# Patient Record
Sex: FEMALE | Race: ASIAN
[De-identification: names, ages, dates, MRNs, and addresses within clinical notes are randomized per-mention and may not be internally consistent; named-entity substitution may affect disease eponyms.]

---

## 2017-07-17 ENCOUNTER — HOSPITAL ENCOUNTER (EMERGENCY)
Dept: HOSPITAL 74 - JER | Age: 22
Discharge: HOME | End: 2017-07-17
Payer: COMMERCIAL

## 2017-07-17 VITALS — HEART RATE: 75 BPM | SYSTOLIC BLOOD PRESSURE: 134 MMHG | DIASTOLIC BLOOD PRESSURE: 64 MMHG

## 2017-07-17 VITALS — BODY MASS INDEX: 18.1 KG/M2

## 2017-07-17 VITALS — TEMPERATURE: 98 F

## 2017-07-17 DIAGNOSIS — O21.1: Primary | ICD-10-CM

## 2017-07-17 DIAGNOSIS — Z3A.11: ICD-10-CM

## 2017-07-17 LAB
ALBUMIN SERPL-MCNC: 3.7 G/DL (ref 3.4–5)
ALP SERPL-CCNC: 60 U/L (ref 45–117)
ALT SERPL-CCNC: 17 U/L (ref 12–78)
ANION GAP SERPL CALC-SCNC: 8 MMOL/L (ref 8–16)
AST SERPL-CCNC: 19 U/L (ref 15–37)
BASOPHILS # BLD: 0.3 % (ref 0–2)
BILIRUB SERPL-MCNC: < 0.1 MG/DL (ref 0.2–1)
CALCIUM SERPL-MCNC: 8.9 MG/DL (ref 8.5–10.1)
CO2 SERPL-SCNC: 24 MMOL/L (ref 21–32)
CREAT SERPL-MCNC: 0.4 MG/DL (ref 0.55–1.02)
DEPRECATED RDW RBC AUTO: 12.9 % (ref 11.6–15.6)
EOSINOPHIL # BLD: 1.9 % (ref 0–4.5)
GLUCOSE SERPL-MCNC: 103 MG/DL (ref 74–106)
MCH RBC QN AUTO: 28.1 PG (ref 25.7–33.7)
MCHC RBC AUTO-ENTMCNC: 33.4 G/DL (ref 32–36)
MCV RBC: 84.1 FL (ref 80–96)
NEUTROPHILS # BLD: 72.4 % (ref 42.8–82.8)
PLATELET # BLD AUTO: 222 K/MM3 (ref 134–434)
PMV BLD: 11.4 FL (ref 7.5–11.1)
PROT SERPL-MCNC: 7.2 G/DL (ref 6.4–8.2)
WBC # BLD AUTO: 11.2 K/MM3 (ref 4–10)

## 2017-07-17 PROCEDURE — 3E033GC INTRODUCTION OF OTHER THERAPEUTIC SUBSTANCE INTO PERIPHERAL VEIN, PERCUTANEOUS APPROACH: ICD-10-PCS

## 2017-07-17 NOTE — PDOC
History of Present Illness





<Anali Callejas - Last Filed: 17 20:23>





- History of Present Illness


Initial Comments: 


17 20:55


Patient is a 22 year old pregnant female (11 weeks) with no significant medical 

hx who has been sent to the ED from gynecologists office for hyperemesis. 

Today the patient saw her OB/GYN for hyperemesis that shes been having 

throughout her pregnancy, and she was sent to the ED with a prescription for IV 

fluids. Denies any fevers, chills, vaginal bleeding, cramping, or vaginal 

discharge.





OB/GYN: Diana James MD


Surgical Hx: Right breast biopsy (benign)


;  LNMP: 2017 


Allergies: NKDA








<Myra Reyna - Last Filed: 17 20:57>





- General


Chief Complaint: Pain, Acute


Stated Complaint: DEHYDRATION/11 WKS PREGNANT


Time Seen by Provider: 17 18:47





Past History





- Past Medical History


Other medical history: PATIENT DENIES MEDICAL HX





- Reproductive History


Is Patient Pregnant Now?: Yes





- Psycho/Social/Smoking Cessation Hx


Suicidal Ideation: No


Smoking History: Never smoked


Hx Alcohol Use: No


Drug/Substance Use Hx: No





<Anali Callejas - Last Filed: 17 20:23>





<Myra Reyna - Last Filed: 17 20:57>





- Past Medical History


Allergies/Adverse Reactions: 


 Allergies











Allergy/AdvReac Type Severity Reaction Status Date / Time


 


No Known Allergies Allergy   Verified 17 16:23











Home Medications: 


Ambulatory Orders





Prenatal Cmb#95/Iron/FA/Dha [Prenatal + Dha Combo Pack] 1 each PO DAILY  











**Review of Systems





- Review of Systems


Comments:: 


17 20:55


CONSTITUTIONAL:


Absent: fever, chills, diaphoresis, generalized weakness, malaise, loss of 

appetite


HEENT:


Absent: rhinorrhea, nasal congestion, throat pain, throat swelling, difficulty 

swallowing, mouth swelling, ear pain, eye pain, visual changes


CARDIOVASCULAR: 


Absent: chest pain, syncope, palpitations, irregular heart rate, lightheadedness

, peripheral edema


RESPIRATORY: 


Absent: cough, shortness of breath, dyspnea with exertion, orthopnea, wheezing, 

stridor, hemoptysis


GASTROINTESTINAL:


Present: nausea, vomiting


Absent: abdominal pain, abdominal distension, diarrhea, constipation, melena, 

hematochezia


GENITOURINARY: 


Absent: dysuria, frequency, urgency, hesitancy, hematuria, flank pain, genital 

pain


MUSCULOSKELETAL: 


Absent: myalgia, arthralgia, joint swelling


SKIN: 


Absent: rash, itching, pallor


HEMATOLOGIC/IMMUNOLOGIC: 


Absent: easy bleeding, easy bruising, lymphadenopathy, frequent infections


ENDOCRINE:


Absent: unexplained weight gain, unexplained weight loss, heat intolerance, 

cold intolerance


NEUROLOGIC: 


Absent: headache, focal weakness or paresthesia, dizziness, unsteady gait, 

seizure, mental status changes, bladder or bowel incontinence.


PSYCHIATRIC: 


Absent: anxiety, depression, suicidal or homicidal ideation, hallucinations








<Myra Reyna - Last Filed: 17 20:57>





*Physical Exam





- Vital Signs


 Last Vital Signs











Temp Pulse Resp BP Pulse Ox


 


 98.0 F   94 H  16   102/65   100 


 


 17 16:19  17 16:19  17 16:19  17 16:19  17 16:19














<Anali Callejas - Last Filed: 17 20:23>





- Vital Signs


 Last Vital Signs











Temp Pulse Resp BP Pulse Ox


 


 98.0 F   75   18   134/64   98 


 


 17 16:19  17 20:26  17 20:26  17 20:26  17 20:26














- Physical Exam


Comments: 


17 20:56


GENERAL:


Well developed, well nourished. Awake and alert. No acute distress.


HEENT:


Normocephalic, atraumatic. PERRLA, EOMI. No conjunctival pallor. Sclera are non-

icteric. Moist mucous membranes. Oropharynx is clear.


NECK: 


Supple. Full ROM. No JVD. Carotid pulses 2+ and symmetric, without bruits. No 

thyromegaly. No lymphadenopathy.


CARDIOVASCULAR:


Regular rate and rhythm. No murmurs, rubs, or gallops. Distal pulses are 2+ and 

symmetric. 


PULMONARY: 


No evidence of respiratory distress. Lungs clear to auscultation bilaterally. 

No wheezing, rales or rhonchi.


ABDOMINAL:


Soft. Non-tender. Non-distended. No rebound or guarding. No organomegaly. 

Normoactive bowel sounds. 


MUSCULOSKELETAL: 


Normal range of motion at all joints. No bony deformities or tenderness. No CVA 

tenderness.


EXTREMITIES: 


No cyanosis. No clubbing. No edema. No calf tenderness.


SKIN: 


Warm and dry. Normal capillary refill. No rashes. No jaundice. 


NEUROLOGICAL: 


Alert, awake, appropriate. Cranial nerves 2-12 intact. Normal speech. Gait is 

normal without ataxia.


PSYCHIATRIC: 


Cooperative. Good eye contact. Appropriate mood and affect.








<Myra Reyna - Last Filed: 17 20:57>





ED Treatment Course





- LABORATORY


CBC & Chemistry Diagram: 


 17 19:00





 17 19:00





- ADDITIONAL ORDERS


Additional order review: 


 Laboratory  Results











  17





  19:00


 


Sodium  140


 


Potassium  3.9


 


Chloride  108 H


 


Carbon Dioxide  24


 


Anion Gap  8


 


BUN  12


 


Creatinine  0.4 L


 


Creat Clearance w eGFR  > 60


 


Random Glucose  103


 


Calcium  8.9


 


Total Bilirubin  < 0.1 L


 


AST  19


 


ALT  17


 


Alkaline Phosphatase  60


 


Total Protein  7.2


 


Albumin  3.7








 











  17





  19:00


 


RBC  4.07


 


MCV  84.1


 


MCHC  33.4


 


RDW  12.9


 


MPV  11.4 H


 


Neutrophils %  72.4


 


Lymphocytes %  19.6


 


Monocytes %  5.8


 


Eosinophils %  1.9


 


Basophils %  0.3














- Medications


Given in the ED: 


ED Medications














Discontinued Medications














Generic Name Dose Route Start Last Admin





  Trade Name Freq  PRN Reason Stop Dose Admin


 


Sodium Chloride  1,000 mls @ 1,000 mls/hr 17 18:48 17 19:19





  Normal Saline -  IV 17 19:47  1,000 mls/hr





  ASDIR STA   Administration


 


Ondansetron HCl  4 mg 17 18:49 17 19:19





  Zofran Injection  IVPUSH 17 18:50  4 mg





  ONCE ONE   Administration














<Anali Callejas - Last Filed: 17 20:23>





- LABORATORY


CBC & Chemistry Diagram: 


 17 19:00





 17 19:00





- ADDITIONAL ORDERS


Additional order review: 


 Laboratory  Results











  17





  19:00


 


Sodium  140


 


Potassium  3.9


 


Chloride  108 H


 


Carbon Dioxide  24


 


Anion Gap  8


 


BUN  12


 


Creatinine  0.4 L


 


Creat Clearance w eGFR  > 60


 


Random Glucose  103


 


Calcium  8.9


 


Total Bilirubin  < 0.1 L


 


AST  19


 


ALT  17


 


Alkaline Phosphatase  60


 


Total Protein  7.2


 


Albumin  3.7








 











  17





  19:00


 


RBC  4.07


 


MCV  84.1


 


MCHC  33.4


 


RDW  12.9


 


MPV  11.4 H


 


Neutrophils %  72.4


 


Lymphocytes %  19.6


 


Monocytes %  5.8


 


Eosinophils %  1.9


 


Basophils %  0.3














- Medications


Given in the ED: 


ED Medications














Discontinued Medications














Generic Name Dose Route Start Last Admin





  Trade Name Liz  PRN Reason Stop Dose Admin


 


Sodium Chloride  1,000 mls @ 1,000 mls/hr 17 18:48 17 19:19





  Normal Saline -  IV 17 19:47  1,000 mls/hr





  ASDIR STA   Administration


 


Ondansetron HCl  4 mg 17 18:49 17 19:19





  Zofran Injection  IVPUSH 17 18:50  4 mg





  ONCE ONE   Administration














<Myra Reyna - Last Filed: 17 20:57>





*DC/Admit/Observation/Transfer





<Anali Callejas - Last Filed: 17 20:23>





- Attestations


Scribe Attestion: 


17 20:57


Documentation prepared by Myra Reyna, acting as medical scribe for Anali Callejas MD.





<Myra Reyna - Last Filed: 17 20:57>


Diagnosis at time of Disposition: 


 Hyperemesis arising during pregnancy





- Discharge Dispostion


Disposition: HOME


Condition at time of disposition: Stable





- Referrals


Referrals: 


Hillary Castaneda MD [Primary Care Provider] - 


Diana James MD [Staff Physician] - 





- Patient Instructions


Printed Discharge Instructions:  DI for Hyperemesis Gravidarum


Additional Instructions: 


please follow up with your  ob/gyn

## 2018-01-25 ENCOUNTER — HOSPITAL ENCOUNTER (INPATIENT)
Dept: HOSPITAL 74 - JDEL | Age: 23
LOS: 4 days | Discharge: HOME | End: 2018-01-29
Attending: OBSTETRICS & GYNECOLOGY | Admitting: OBSTETRICS & GYNECOLOGY
Payer: COMMERCIAL

## 2018-01-25 VITALS — BODY MASS INDEX: 23.1 KG/M2

## 2018-01-25 DIAGNOSIS — O46.93: Primary | ICD-10-CM

## 2018-01-25 DIAGNOSIS — Z3A.38: ICD-10-CM

## 2018-01-25 LAB
ANION GAP SERPL CALC-SCNC: 13 MMOL/L (ref 8–16)
APTT BLD: 31.5 SECONDS (ref 26.9–34.4)
BASOPHILS # BLD: 0.2 % (ref 0–2)
BUN SERPL-MCNC: 8 MG/DL (ref 7–18)
CALCIUM SERPL-MCNC: 9.4 MG/DL (ref 8.5–10.1)
CHLORIDE SERPL-SCNC: 105 MMOL/L (ref 98–107)
CO2 SERPL-SCNC: 23 MMOL/L (ref 21–32)
CREAT SERPL-MCNC: 0.3 MG/DL (ref 0.55–1.02)
DEPRECATED RDW RBC AUTO: 18.6 % (ref 11.6–15.6)
EOSINOPHIL # BLD: 1.5 % (ref 0–4.5)
GLUCOSE SERPL-MCNC: 80 MG/DL (ref 74–106)
HCT VFR BLD CALC: 36.4 % (ref 32.4–45.2)
HGB BLD-MCNC: 11.9 GM/DL (ref 10.7–15.3)
INR BLD: 0.95 (ref 0.82–1.09)
LYMPHOCYTES # BLD: 20.3 % (ref 8–40)
MCH RBC QN AUTO: 27.5 PG (ref 25.7–33.7)
MCHC RBC AUTO-ENTMCNC: 32.8 G/DL (ref 32–36)
MCV RBC: 83.8 FL (ref 80–96)
MONOCYTES # BLD AUTO: 8.1 % (ref 3.8–10.2)
NEUTROPHILS # BLD: 69.9 % (ref 42.8–82.8)
PLATELET # BLD AUTO: 183 K/MM3 (ref 134–434)
PMV BLD: 11 FL (ref 7.5–11.1)
POTASSIUM SERPLBLD-SCNC: 4.2 MMOL/L (ref 3.5–5.1)
PT PNL PPP: 10.7 SEC (ref 9.98–11.88)
RBC # BLD AUTO: 4.35 M/MM3 (ref 3.6–5.2)
SODIUM SERPL-SCNC: 141 MMOL/L (ref 136–145)
WBC # BLD AUTO: 11.1 K/MM3 (ref 4–10)

## 2018-01-25 RX ADMIN — SODIUM CHLORIDE, SODIUM GLUCONATE, SODIUM ACETATE, POTASSIUM CHLORIDE, AND MAGNESIUM CHLORIDE SCH MLS/HR: 526; 502; 368; 37; 30 INJECTION, SOLUTION INTRAVENOUS at 06:00

## 2018-01-25 RX ADMIN — SODIUM CHLORIDE, SODIUM GLUCONATE, SODIUM ACETATE, POTASSIUM CHLORIDE, AND MAGNESIUM CHLORIDE SCH MLS/HR: 526; 502; 368; 37; 30 INJECTION, SOLUTION INTRAVENOUS at 17:55

## 2018-01-25 NOTE — HP
Past Medical History





- Primary Care Physician


PCP:: Diana James





- Admission


Chief Complaint: Bloody show


History of Present Illness: 





23 yo  EDC 18 EGA 38.1 week with c/o of heavy bloody show and 

contractions


+ AFM -HA +pain


History Source: Patient


Limitations to Obtaining History: No Limitations





- Past Medical History


...: 1


...Para: 0


...Term: 0


...: 0


...Spon : 0


...Induced : 0


...Multiple Gestation: 0


...LMP: 17


... Weeks Gestation by Dates: 38.1


...EDC by Dates: 18


...EDC by Sono: 18





- Past Surgical History


Past Surgical History: Yes: None


Hx Myomectomy: No


Hx Transabdominal Cerclage: No





- Smoking History


Smoking history: Never smoked


Have you smoked in the past 12 months: No





- Alcohol/Substance Use


Hx Alcohol Use: No





- Social History


Usual Living Arrangement: Yes: With Spouse


History of Recent Travel: No





Home Medications





- Allergies


Allergies/Adverse Reactions: 


 Allergies











Allergy/AdvReac Type Severity Reaction Status Date / Time


 


No Known Allergies Allergy   Verified 17 16:23














- Home Medications


Home Medications: 


Ambulatory Orders





Prenatal 95/Iron Fum/Folic/Dha [Prenatal + Dha Combo Pack] 1 each PO DAILY  


Ferrous Sulfate [Feosol] 325 mg PO DAILY 18 











Physical Exam - Maternity


Vital Signs: 


 Vital Signs











Temperature  98.7 F   18 10:00


 


Pulse Rate  108 H  18 10:00


 


Respiratory Rate  20   18 10:00


 


Blood Pressure  98/67   18 10:00


 


O2 Sat by Pulse Oximetry (%)      











Constitutional: Yes: Well Nourished, No Distress


Neck: Yes: WNL


Cardiovascular: Yes: WNL


Lungs: Clear to auscultation





- Abdominal Exam/OB


Fundal Height: 38


Number of Fetuses: Single


Contractions: Yes


Regularity: Irritability


Intensity: Unaware


Fetal Monitor Mode: External


Fetal Heart Rate (range): 140


Fetal Heart Rate Location: [facility]


Category: I


Accelerations: Non-Uniform





- Vaginal Exam/OB


Speculum Exam: Yes


Dilatation (cm): 1


Effacement (%): 50


Amniotic Membrane Status: Intact


Fetal Presentation: Vertex/Position


Fetal Station: 0





- Labs


Lab Results: 


 CBC, BMP





 18 06:05 





 18 06:05 











Problem List





- Problems


(1) Pregnancy with third trimester bleeding


Code(s): O46.93 - ANTEPARTUM HEMORRHAGE, UNSPECIFIED, THIRD TRIMESTER   





Assessment/Plan





IUP at 38.1 week


Abnormal vaginal bleeding


Heavy bloody show


Cat 1 


Prodromal Labor





Plan


ADmit due to bleeding


usg/BPP with MFM


send to floor for observation

## 2018-01-26 RX ADMIN — SODIUM CHLORIDE, SODIUM GLUCONATE, SODIUM ACETATE, POTASSIUM CHLORIDE, AND MAGNESIUM CHLORIDE SCH MLS/HR: 526; 502; 368; 37; 30 INJECTION, SOLUTION INTRAVENOUS at 08:00

## 2018-01-26 RX ADMIN — SODIUM CHLORIDE, SODIUM GLUCONATE, SODIUM ACETATE, POTASSIUM CHLORIDE, AND MAGNESIUM CHLORIDE SCH MLS/HR: 526; 502; 368; 37; 30 INJECTION, SOLUTION INTRAVENOUS at 16:00

## 2018-01-26 NOTE — PN
Ante-Partal Exam





- Subjective


Vital Signs: 


 Vital Signs











Temperature  97.8 F   01/26/18 21:13


 


Pulse Rate  112 H  01/26/18 21:13


 


Respiratory Rate  18   01/26/18 21:13


 


Blood Pressure  139/74   01/26/18 21:13


 


O2 Sat by Pulse Oximetry (%)      











Bleeding: Yes


Bleeding Description: Mild


Headache: No


Visual changes: No


Right upper quadrant pain: No


Pain (scale 1-10): 3





- Contractions


Contractions: Yes


Regularity: Regular


Intensity: Moderate


Fetal Monitor Mode: External





- Exam during Labor


Fetal Heart Rate: 149


Variability: Moderate


Fetal Heart Rate Location: [facility] (patient is requesting pain management stadol 2 

and phenergan is given with good results.)

## 2018-01-27 LAB
ARTERIAL BLOOD GAS PCO2: 44 MMHG (ref 35–45)
BASE EXCESS BLDA CALC-SCNC: -2.5 MEQ/L (ref -2–2)
PH BLDV: 7.38 [PH] (ref 7.32–7.42)
PO2 BLDA: 28.7 MMHG (ref 80–100)
SAO2 % BLDA: 64.5 % (ref 90–98.9)
VENOUS PC02: 39.2 MMHG (ref 38–52)
VENOUS PO2: 33.6 MMHG (ref 28–48)

## 2018-01-27 RX ADMIN — SODIUM CHLORIDE, SODIUM GLUCONATE, SODIUM ACETATE, POTASSIUM CHLORIDE, AND MAGNESIUM CHLORIDE SCH MLS/HR: 526; 502; 368; 37; 30 INJECTION, SOLUTION INTRAVENOUS at 06:30

## 2018-01-27 RX ADMIN — ACETAMINOPHEN PRN MG: 325 TABLET ORAL at 17:30

## 2018-01-27 RX ADMIN — IBUPROFEN PRN MG: 600 TABLET, FILM COATED ORAL at 17:30

## 2018-01-27 NOTE — PN
Delivery





- Delivery


Vaginal Delivery: Vacuum Assist (consent obtained)


Type of Anesthesia: Local, Epidural


Episiotomy/Laceration: Right Mediolateral


EBL (cc): 500 (shoulders delivered without complaints )





Delivery, Single Birth





- Stages of Labor


Placenta: Yes: Spontaneous





- Condition of Infant


Infant Gender: Male


Position: OA





-  Feeding Plan


Initial Plan: Elected not to breastfeed exclusively throughout hospitalization

## 2018-01-27 NOTE — PROC
Obstetrical Vaccum Device





- Doc. Following  Use of Vaccum Device


Indications for use: Maternal Exhaustion


Risks and Benefits Explained: Yes


Consent on Chart: Yes


Fetal Station: 2


Position: OA


Caput: No


Proper placement of cup confirmed: No


Reduction of pressure between contractions: Yes


Appearance of fetal head on delivery: Normal


Pediatrician present during vacuum extraction: No


Pediatrician & nursery staff notified of vacuum extraction: Yes

## 2018-01-27 NOTE — PN
Ante-Partal Exam





- Subjective


Subjective: 





Pt with pressure


Vital Signs: 


 Vital Signs











Temperature  98.8 F   01/27/18 06:00


 


Pulse Rate  125 H  01/27/18 08:00


 


Respiratory Rate  18   01/27/18 08:00


 


Blood Pressure  110/63   01/27/18 08:00


 


O2 Sat by Pulse Oximetry (%)  96   01/27/18 08:00











Bleeding: No


Headache: No


Visual changes: No


Right upper quadrant pain: No





- Contractions


Contractions: Yes


Regularity: Regular


Intensity: Moderate


Fetal Monitor Mode: External





- Exam during Labor


Variability: Moderate


Fetal Heart Rate Location: [facility]


Category: I


Fetal Monitor Decelerations: None


Exam: Vaginal


Dilatation (cm): 10


Effacement (%): 100


Amniotic Membrane Status: Ruptured


Fetal Presentation: Vertex


Fetal Station: +1





- Intrapartum Hemorrhage Risk


Risk Score: 0


Risk Level: Low Risk





- Assessment/Plan


Assessment/Plan: 





IUP at 38.3 week


Labor


2nd stage of labor





Plan


anticipate vaginal delivery

## 2018-01-27 NOTE — PN
Ante-Partal Exam





- Subjective


Vital Signs: 


 Vital Signs











Temperature  98.8 F   01/27/18 06:00


 


Pulse Rate  104 H  01/27/18 06:45


 


Respiratory Rate  20   01/27/18 06:45


 


Blood Pressure  101/73   01/27/18 06:45


 


O2 Sat by Pulse Oximetry (%)  97   01/27/18 06:45











Bleeding: No


Headache: No


Visual changes: No


Right upper quadrant pain: No


Pain (scale 1-10): 2





- Contractions


Contractions: Yes


Regularity: Regular


Intensity: Mod/Strong


Fetal Monitor Mode: External





- Exam during Labor


Fetal Heart Rate: 150


Variability: Moderate


Fetal Heart Rate Location: [facility]


Category: I


Fetal Monitor Accelerations: Present


Fetal Monitor Decelerations: None


Exam: Vaginal


Dilatation (cm): 9cm 


Amniotic Membrane Status: Ruptured


Amniotic Fluid: Clear


Fetal Presentation: Vertex


Fetal Station: 0





- Intrapartum Hemorrhage Risk


Medium Risk Factors: None


Risk Score: 0


Risk Level: Low Risk





- Assessment/Plan


Assessment/Plan: 





continue labor management,

## 2018-01-28 LAB
BASOPHILS # BLD: 0.1 % (ref 0–2)
DEPRECATED RDW RBC AUTO: 18.6 % (ref 11.6–15.6)
EOSINOPHIL # BLD: 1.3 % (ref 0–4.5)
HCT VFR BLD CALC: 28.8 % (ref 32.4–45.2)
HGB BLD-MCNC: 9.2 GM/DL (ref 10.7–15.3)
LYMPHOCYTES # BLD: 12.4 % (ref 8–40)
MCH RBC QN AUTO: 27.3 PG (ref 25.7–33.7)
MCHC RBC AUTO-ENTMCNC: 32 G/DL (ref 32–36)
MCV RBC: 85.1 FL (ref 80–96)
MONOCYTES # BLD AUTO: 5.9 % (ref 3.8–10.2)
NEUTROPHILS # BLD: 80.3 % (ref 42.8–82.8)
PLATELET # BLD AUTO: 159 K/MM3 (ref 134–434)
PMV BLD: 11 FL (ref 7.5–11.1)
RBC # BLD AUTO: 3.38 M/MM3 (ref 3.6–5.2)
WBC # BLD AUTO: 15.9 K/MM3 (ref 4–10)

## 2018-01-28 RX ADMIN — IBUPROFEN PRN MG: 600 TABLET, FILM COATED ORAL at 05:49

## 2018-01-28 RX ADMIN — ACETAMINOPHEN PRN MG: 325 TABLET ORAL at 05:48

## 2018-01-28 NOTE — PN
Progress Note (SOAP)





- Subjective


Chief Complaint: 





Pt doing well fond sitting in chair with baby





- Current Medications


Current Medications: 


Active Medications





Acetaminophen (Tylenol -)  650 mg PO Q4H PRN


   PRN Reason: FEVER


   Last Admin: 18 05:48 Dose:  650 mg


Acetaminophen (Tylenol -)  325 mg PO Q4H PRN


   Stop: 18 09:51


   Last Admin: 18 09:50 Dose:  325 mg


Benzocaine (Americaine 20% Spray -)  1 spray TP PRN PRN


   PRN Reason: PAIN


   Last Admin: 18 17:29 Dose:  1 spray


Benzocaine (Americaine Ointment -)  1 applic FL PRN PRN


   PRN Reason: PAIN


Bisacodyl (Dulcolax Suppository -)  10 mg RC PRN PRN


   PRN Reason: CONSTIPATION


Diphtheria/Tetanus/Acell Pertussis (Boostrix -)  0.5 ml IM .ONCE ONE


   Stop: 18 10:01


Ibuprofen (Motrin -)  600 mg PO Q4H PRN


   PRN Reason: PAIN


   Last Admin: 18 05:49 Dose:  600 mg


Influenza Virus Vaccine Quadrival (Fluarix Quad [id number] Syringe)  60 mcg IM 

.ONCE ONE


   Stop: 18 10:01


Methylergonovine Maleate (Methergine Injection -)  0.2 mg IM Q4H PRN


   PRN Reason: EXCESSIVE BLEEDING


Oxycodone HCl (Roxicodone -)  5 mg PO Q4H PRN


   Last Admin: 18 09:50 Dose:  5 mg


Witch Hazel/Glycerin (Tucks Pads -)  1 pad TP PRN PRN


   PRN Reason: PAIN











- Objective


Vital Signs: 


 Vital Signs











Temperature  98.2 F   18 05:49


 


Pulse Rate  94 H  18 05:49


 


Respiratory Rate  18   18 05:49


 


Blood Pressure  112/66   18 05:49


 


O2 Sat by Pulse Oximetry (%)  99   18 10:30











Constitutional: Yes: Well Nourished, No Distress


Gastrointestinal: Yes: WNL, Normal Bowel Sounds


....Post Partum: Yes: Uterus firm, Uterus non-tender


Musculoskeletal: Yes: WNL


Extremities: Yes: WNL


Edema: No


Wound/Incision: Yes: Well Approximated


Neurological: Yes: WNL, Alert, Oriented





Labs


Lab Results: 


 CBC, BMP





 18 06:05 











Problem List





- Problems


(1) Pregnancy with third trimester bleeding


Code(s): O46.93 - ANTEPARTUM HEMORRHAGE, UNSPECIFIED, THIRD TRIMESTER   





Assessment/Plan





SP 





Plan


DC home in am

## 2018-01-29 VITALS — TEMPERATURE: 98.2 F | HEART RATE: 85 BPM | DIASTOLIC BLOOD PRESSURE: 70 MMHG | SYSTOLIC BLOOD PRESSURE: 107 MMHG

## 2018-01-29 NOTE — DS
Physical Exam-GYN


Vital Signs: 


 Vital Signs











Temperature  98.2 F   18 07:45


 


Pulse Rate  85   18 07:45


 


Respiratory Rate  20   18 07:45


 


Blood Pressure  107/70   18 07:45


 


O2 Sat by Pulse Oximetry (%)  99   18 10:30











Constitutional: Yes: Well Nourished, No Distress


Cardiovascular: Yes: WNL


Respiratory: Yes: WNL


Gastrointestinal: Yes: WNL, Normal Bowel Sounds


Pelvis: Yes: Hernia Right


....Post Partum: Yes: Uterus firm, Uterus non-tender


Breast(s): Yes: WNL


Musculoskeletal: Yes: WNL


Extremities: Yes: WNL


Edema: No


Labs: 


 CBC, BMP





 18 08:00 





 18 06:05 











Delivery





- Delivery


Vaginal Delivery: Vacuum Assist (consent obtained)


Type of Anesthesia: Local, Epidural


Episiotomy/Laceration: Right Mediolateral


EBL (cc): 500 (shoulders delivered without complaints )





Delivery, Single Birth





- Stages of Labor


Date 1st Stage Initiatied: 18


Time 1st Stage Initiated: 23:00


Date 2nd Stage Initiated: 18


Time 2nd Stage Initiated: 08:28


Date of Delivery: 18


Time of Delivery: 09:25


Time Placenta Delivered: 09:28


Placenta: Yes: Spontaneous





- Condition of Infant


Pediatrician/Neonatologist Present: No


Infant Gender: Male


Birth Weight: 7 lb 5 oz


Position: OA


Total Hours ROM (Hrs/Mins): 1hr





- Apgar


  ** 1 Minute


Apgar Total Score: 9





  ** 5 Minutes


Apgar Total Score: 9





- [place] Feeding Plan


Initial Plan: Elected not to breastfeed exclusively throughout hospitalization





Discharge Summary


Reason For Visit: ANTEPARTUM VAGINAL BLEEDING


Current Active Problems





Pregnancy with third trimester bleeding (Acute)








Procedures: Principal: Vacuum Delivery


Condition: Good





- Instructions


Diet, Activity, Other Instructions: 





Physical activity





Resume your normal everyday activity as tolerated no heavy lifting or exercise 

until seen by your surgeon. You may walk unlimited iker of and climb stairs. 

You may resume driving the car when you feel safe and comfortable behind the 

wheel. No sexual activity as instructed.





Wound care


If you have a bandage, leave it on, and keep dry for 48-72 hours. After that 

time discard the outer bandage. If they are tapes on the skin under the out of 

bandage leave them in place. They will peel off in the next 7 to 10 days. Do 

Not Peel them off. You may shower the day after surgery. If there are tapes 

present on the skin, you may shower over them.





Diet


There are no dietary restrictions. Eat healthy, high-fiber foods. Drink 6 to 8 

glasses of liquid each day. This will assist in keeping your bowels are regular.





Pain management


You may take Tylenol or acetaminophen or Ibuprofen (for example, Motrin, Advil 

etc.) from my pain prescription medication is ordered should be taken as 

prescribed for moderate to severe pain.





Call MD for any of the following:





Severe pain not relieved by medication


Fever of 101 or higher


Excessive bleeding or drainage on dressing


Inability to urinate











Call Dr. James and make appt. to be seen in 4 weeks.








Referrals: 


Diana James MD [Staff Physician] - 


Disposition: HOME





- Home Medications


Comprehensive Discharge Medication List: 


Ambulatory Orders





Prenatal 95/Iron Fum/Folic/Dha [Prenatal + Dha Combo Pack] 1 each PO DAILY  


Ferrous Sulfate [Feosol] 325 mg PO DAILY 18 


Ibuprofen [Motrin -] 600 mg PO QID #28 tablet 18

## 2018-10-16 ENCOUNTER — HOSPITAL ENCOUNTER (EMERGENCY)
Dept: HOSPITAL 74 - JERFT | Age: 23
Discharge: HOME | End: 2018-10-16
Payer: COMMERCIAL

## 2018-10-16 VITALS — HEART RATE: 99 BPM | TEMPERATURE: 97.9 F | SYSTOLIC BLOOD PRESSURE: 104 MMHG | DIASTOLIC BLOOD PRESSURE: 66 MMHG

## 2018-10-16 VITALS — BODY MASS INDEX: 18.3 KG/M2

## 2018-10-16 DIAGNOSIS — T78.40XA: Primary | ICD-10-CM
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
## 2018-10-16 PROCEDURE — 3E0233Z INTRODUCTION OF ANTI-INFLAMMATORY INTO MUSCLE, PERCUTANEOUS APPROACH: ICD-10-PCS

## 2018-10-16 NOTE — PDOC
History of Present Illness





- General


Chief Complaint: Rash


Stated Complaint: RASH


Time Seen by Provider: 10/16/18 12:55


History Source: Patient


Exam Limitations: No Limitations





- History of Present Illness


Initial Comments: 





10/16/18 13:04


23 yr female with c/o rash and itching all over body for 2 days unknown cause 

no fever no chills. no meds.





Past History





- Past Medical History


Allergies/Adverse Reactions: 


 Allergies











Allergy/AdvReac Type Severity Reaction Status Date / Time


 


No Known Allergies Allergy   Verified 10/16/18 12:36











Home Medications: 


Ambulatory Orders





NK [No Known Home Medication]  10/16/18 








Asthma: No


Cancer: No


Cardiac Disorders: No


COPD: No


Diabetes: No


HTN: No


Seizures: No


Thyroid Disease: No





- Suicide/Smoking/Psychosocial Hx


Smoking History: Never smoked


Have you smoked in the past 12 months: No


Hx Alcohol Use: No


Drug/Substance Use Hx: No


Hx Substance Use Treatment: No





**Review of Systems





- Review of Systems


Able to Perform ROS?: Yes


Is the patient limited English proficient: No


Constitutional: No: Symptoms Reported


HEENTM: No: Symptoms Reported


Respiratory: No: Symptoms reported


Cardiac (ROS): No: Symptoms Reported


ABD/GI: No: Symptoms Reported


: No: Symptoms Reported


Musculoskeletal: No: Symptoms Reported


Integumentary: Yes: Symptoms Reported


Neurological: No: Symptoms reported





*Physical Exam





- Vital Signs


 Last Vital Signs











Temp Pulse Resp BP Pulse Ox


 


 97.9 F   99 H  16   104/66   99 


 


 10/16/18 12:37  10/16/18 12:37  10/16/18 12:37  10/16/18 12:37  10/16/18 12:37














- Physical Exam


General Appearance: Yes: Nourished, Appropriately Dressed


HEENT: positive: EOMI, KYRA, Normal ENT Inspection, TMs Normal, Pharynx Normal


Neck: positive: Supple.  negative: Tender


Respiratory/Chest: positive: Lungs Clear, Normal Breath Sounds.  negative: 

Chest Tender


Cardiovascular: positive: Regular Rhythm, Regular Rate


Gastrointestinal/Abdominal: positive: Normal Bowel Sounds, Soft


Musculoskeletal: positive: Normal Inspection


Extremity: positive: Normal Capillary Refill, Normal Inspection, Normal Range 

of Motion


Integumentary: positive: Normal Color, Dry, Warm, Rash (erythematous patchyrash 

face, neck ears upper back )


Neurologic: positive: Fully Oriented, Alert, Normal Mood/Affect, Normal Response

, Motor Strength 5/5





Medical Decision Making





- Medical Decision Making





10/16/18 13:06


cc: itchy rash since yesterday 


     no diff breathing or diff swallowing


     no meds taken at home


    will give kenalog 


dc home with benadryl  








*DC/Admit/Observation/Transfer


Diagnosis at time of Disposition: 


Allergic reaction


Qualifiers:


 Encounter type: initial encounter Qualified Code(s): T78.40XA - Allergy, 

unspecified, initial encounter








- Discharge Dispostion


Disposition: HOME


Condition at time of disposition: Good





- Referrals


Referrals: 


Hillary Castaneda MD [Primary Care Provider] - 





- Patient Instructions


Additional Instructions: 


cool water to bathe, avoid hot water, use a calming soap such as aveeno oatmeal 


take benadryl 50mg at bedtime to help with itching


during the day take a non drowsy antihistamine such as Claritin or Allegra 


follow with the allergist as needed


Return if any worsening symptoms 





- Post Discharge Activity

## 2021-12-25 ENCOUNTER — HOSPITAL ENCOUNTER (INPATIENT)
Dept: HOSPITAL 74 - JDEL | Age: 26
LOS: 2 days | Discharge: HOME | DRG: 560 | End: 2021-12-27
Attending: OBSTETRICS & GYNECOLOGY | Admitting: OBSTETRICS & GYNECOLOGY
Payer: COMMERCIAL

## 2021-12-25 VITALS — BODY MASS INDEX: 23.3 KG/M2

## 2021-12-25 DIAGNOSIS — Z3A.39: ICD-10-CM

## 2021-12-25 LAB
ANION GAP SERPL CALC-SCNC: 10 MMOL/L (ref 8–16)
APTT BLD: 25.3 SECONDS (ref 25.2–36.5)
BASE EXCESS BLDCOA CALC-SCNC: -4.8 MMOL/L (ref 0–2)
BASE EXCESS BLDCOA CALC-SCNC: -5.8 MMOL/L (ref 0–2)
BASOPHILS # BLD: 0.2 % (ref 0–2)
BUN SERPL-MCNC: 11.4 MG/DL (ref 7–18)
CALCIUM SERPL-MCNC: 9.2 MG/DL (ref 8.5–10.1)
CHLORIDE SERPL-SCNC: 107 MMOL/L (ref 98–107)
CO2 SERPL-SCNC: 21 MMOL/L (ref 21–32)
CREAT SERPL-MCNC: 0.4 MG/DL (ref 0.55–1.3)
DEPRECATED RDW RBC AUTO: 16.7 % (ref 11.6–15.6)
EOSINOPHIL # BLD: 0.9 % (ref 0–4.5)
GLUCOSE SERPL-MCNC: 82 MG/DL (ref 74–106)
HCO3 BLDCO-SCNC: 20.7 MMHG (ref 20–29)
HCO3 BLDCO-SCNC: 22.8 MMHG (ref 20–29)
HCT VFR BLD CALC: 30.3 % (ref 32.4–45.2)
HGB BLD-MCNC: 9.6 GM/DL (ref 10.7–15.3)
HIV 1+2 AB+HIV1 P24 AG SERPL QL IA: NEGATIVE
INR BLD: 0.97 (ref 0.83–1.09)
LYMPHOCYTES # BLD: 23.6 % (ref 8–40)
MCH RBC QN AUTO: 22.1 PG (ref 25.7–33.7)
MCHC RBC AUTO-ENTMCNC: 31.6 G/DL (ref 32–36)
MCV RBC: 69.9 FL (ref 80–96)
MONOCYTES # BLD AUTO: 5.5 % (ref 3.8–10.2)
NEUTROPHILS # BLD: 69.8 % (ref 42.8–82.8)
PCO2 BLDCO: 40.1 MMHG (ref 30–78)
PCO2 BLDCO: 56.6 MMHG (ref 30–78)
PH BLDCO: 7.22 [PH] (ref 7.14–7.44)
PH BLDCO: 7.33 [PH] (ref 7.14–7.44)
PLATELET # BLD AUTO: 246 10^3/UL (ref 134–434)
PMV BLD: 11 FL (ref 7.5–11.1)
PT PNL PPP: 11.3 SEC (ref 9.7–13)
RBC # BLD AUTO: 4.33 M/MM3 (ref 3.6–5.2)
SODIUM SERPL-SCNC: 138 MMOL/L (ref 136–145)
TREPONEMA PALLIDUM AB [UNITS/VOLUME] IN SERUM OR PLASMA BY IMMUNOASSAY: (no result)
WBC # BLD AUTO: 10.8 K/MM3 (ref 4–10)

## 2021-12-25 PROCEDURE — U0003 INFECTIOUS AGENT DETECTION BY NUCLEIC ACID (DNA OR RNA); SEVERE ACUTE RESPIRATORY SYNDROME CORONAVIRUS 2 (SARS-COV-2) (CORONAVIRUS DISEASE [COVID-19]), AMPLIFIED PROBE TECHNIQUE, MAKING USE OF HIGH THROUGHPUT TECHNOLOGIES AS DESCRIBED BY CMS-2020-01-R: HCPCS

## 2021-12-25 PROCEDURE — C9803 HOPD COVID-19 SPEC COLLECT: HCPCS

## 2021-12-25 PROCEDURE — G0008 ADMIN INFLUENZA VIRUS VAC: HCPCS

## 2021-12-25 PROCEDURE — U0005 INFEC AGEN DETEC AMPLI PROBE: HCPCS

## 2021-12-25 RX ADMIN — IBUPROFEN PRN MG: 600 TABLET, FILM COATED ORAL at 09:05

## 2021-12-25 RX ADMIN — SODIUM CHLORIDE, SODIUM GLUCONATE, SODIUM ACETATE, POTASSIUM CHLORIDE, AND MAGNESIUM CHLORIDE SCH: 526; 502; 368; 37; 30 INJECTION, SOLUTION INTRAVENOUS at 09:16

## 2021-12-25 RX ADMIN — SODIUM CHLORIDE, SODIUM GLUCONATE, SODIUM ACETATE, POTASSIUM CHLORIDE, AND MAGNESIUM CHLORIDE SCH MLS/HR: 526; 502; 368; 37; 30 INJECTION, SOLUTION INTRAVENOUS at 04:30

## 2021-12-25 RX ADMIN — IBUPROFEN PRN MG: 600 TABLET, FILM COATED ORAL at 12:55

## 2021-12-25 RX ADMIN — FERROUS SULFATE TAB EC 324 MG (65 MG FE EQUIVALENT) SCH MG: 324 (65 FE) TABLET DELAYED RESPONSE at 12:55

## 2021-12-25 RX ADMIN — IBUPROFEN PRN MG: 600 TABLET, FILM COATED ORAL at 22:01

## 2021-12-25 RX ADMIN — Medication SCH MLS/HR: at 07:44

## 2021-12-25 RX ADMIN — Medication SCH TAB: at 12:54

## 2021-12-26 RX ADMIN — Medication SCH: at 21:38

## 2021-12-26 RX ADMIN — IBUPROFEN PRN MG: 600 TABLET, FILM COATED ORAL at 20:04

## 2021-12-26 RX ADMIN — SODIUM CHLORIDE, SODIUM GLUCONATE, SODIUM ACETATE, POTASSIUM CHLORIDE, AND MAGNESIUM CHLORIDE SCH: 526; 502; 368; 37; 30 INJECTION, SOLUTION INTRAVENOUS at 21:38

## 2021-12-26 RX ADMIN — IBUPROFEN PRN MG: 600 TABLET, FILM COATED ORAL at 09:51

## 2021-12-26 RX ADMIN — Medication SCH TAB: at 09:43

## 2021-12-26 RX ADMIN — FERROUS SULFATE TAB EC 324 MG (65 MG FE EQUIVALENT) SCH MG: 324 (65 FE) TABLET DELAYED RESPONSE at 09:44

## 2021-12-27 VITALS — DIASTOLIC BLOOD PRESSURE: 66 MMHG | SYSTOLIC BLOOD PRESSURE: 106 MMHG | TEMPERATURE: 97.9 F | HEART RATE: 76 BPM

## 2021-12-27 LAB
BASOPHILS # BLD: 0.2 % (ref 0–2)
DEPRECATED RDW RBC AUTO: 16.6 % (ref 11.6–15.6)
EOSINOPHIL # BLD: 2.6 % (ref 0–4.5)
HCT VFR BLD CALC: 26.3 % (ref 32.4–45.2)
HGB BLD-MCNC: 8.4 GM/DL (ref 10.7–15.3)
LYMPHOCYTES # BLD: 11.4 % (ref 8–40)
MCH RBC QN AUTO: 22.3 PG (ref 25.7–33.7)
MCHC RBC AUTO-ENTMCNC: 31.8 G/DL (ref 32–36)
MCV RBC: 70 FL (ref 80–96)
MONOCYTES # BLD AUTO: 6.1 % (ref 3.8–10.2)
NEUTROPHILS # BLD: 79.7 % (ref 42.8–82.8)
PLATELET # BLD AUTO: 233 10^3/UL (ref 134–434)
PMV BLD: 10.3 FL (ref 7.5–11.1)
RBC # BLD AUTO: 3.76 M/MM3 (ref 3.6–5.2)
WBC # BLD AUTO: 11 K/MM3 (ref 4–10)

## 2021-12-27 RX ADMIN — FERROUS SULFATE TAB EC 324 MG (65 MG FE EQUIVALENT) SCH MG: 324 (65 FE) TABLET DELAYED RESPONSE at 10:44

## 2021-12-27 RX ADMIN — Medication SCH TAB: at 10:44

## 2021-12-27 RX ADMIN — IBUPROFEN PRN MG: 600 TABLET, FILM COATED ORAL at 10:45

## 2022-10-10 NOTE — PN
Please take Motrin or Tylenol as needed for your sore throat. Please schedule appointment with your primary care provider in the next few days to further discuss her symptoms. Please return the emergency room if you develop any worsening pain, symptoms, difficulty swallowing, any other concerning symptoms. Progress Note (SOAP)





- Subjective


Chief Complaint: 





Pt with mild spotting





- Current Medications


Current Medications: 


Active Medications





Diphtheria/Tetanus/Acell Pertussis (Boostrix -)  0.5 ml IM .ONCE ONE


   Stop: 01/26/18 16:01


Parenteral Electrolytes (Plasma-Lyte 148 -)  1,000 mls @ 125 mls/hr IV ASDIR [place]RON


   Last Admin: 01/26/18 08:00 Dose:  125 mls/hr


Influenza Virus Vaccine Quadrival (Fluarix Quad [id number] Syringe)  60 mcg IM 

.ONCE ONE


   Stop: 01/26/18 16:01











- Objective


Vital Signs: 


 Vital Signs











Temperature  98.7 F   01/26/18 13:30


 


Pulse Rate  98 H  01/26/18 13:30


 


Respiratory Rate  20   01/26/18 13:30


 


Blood Pressure  126/62   01/26/18 13:30


 


O2 Sat by Pulse Oximetry (%)      











Constitutional: Yes: Well Nourished, No Distress


Cardiovascular: Yes: WNL


Respiratory: Yes: WNL


Gastrointestinal: Yes: WNL, Normal Bowel Sounds, Soft (38 cm gravid)


Breast(s): Yes: WNL


Musculoskeletal: Yes: WNL


Extremities: Yes: WNL


Edema: No


Neurological: Yes: WNL, Alert, Oriented





Labs


Lab Results: 


 CBC, BMP





 01/25/18 06:05 





 01/25/18 06:05 











Problem List





- Problems


(1) Pregnancy with third trimester bleeding


Code(s): O46.93 - ANTEPARTUM HEMORRHAGE, UNSPECIFIED, THIRD TRIMESTER   





Assessment/Plan





IUP at 38.2 week


continued vaginal bleeding





Plan 


Bed rest continue to observe


DC home in am  if stable